# Patient Record
Sex: MALE | Race: WHITE | NOT HISPANIC OR LATINO
[De-identification: names, ages, dates, MRNs, and addresses within clinical notes are randomized per-mention and may not be internally consistent; named-entity substitution may affect disease eponyms.]

---

## 2024-03-19 PROBLEM — Z00.00 ENCOUNTER FOR PREVENTIVE HEALTH EXAMINATION: Status: ACTIVE | Noted: 2024-03-19

## 2024-03-20 ENCOUNTER — NON-APPOINTMENT (OUTPATIENT)
Age: 29
End: 2024-03-20

## 2024-03-22 ENCOUNTER — NON-APPOINTMENT (OUTPATIENT)
Age: 29
End: 2024-03-22

## 2024-03-22 ENCOUNTER — APPOINTMENT (OUTPATIENT)
Dept: COLORECTAL SURGERY | Facility: CLINIC | Age: 29
End: 2024-03-22
Payer: COMMERCIAL

## 2024-03-22 VITALS
SYSTOLIC BLOOD PRESSURE: 136 MMHG | DIASTOLIC BLOOD PRESSURE: 77 MMHG | TEMPERATURE: 98.2 F | WEIGHT: 174 LBS | HEIGHT: 77 IN | HEART RATE: 78 BPM | BODY MASS INDEX: 20.55 KG/M2

## 2024-03-22 DIAGNOSIS — Z78.9 OTHER SPECIFIED HEALTH STATUS: ICD-10-CM

## 2024-03-22 DIAGNOSIS — K62.5 HEMORRHAGE OF ANUS AND RECTUM: ICD-10-CM

## 2024-03-22 DIAGNOSIS — K64.8 OTHER HEMORRHOIDS: ICD-10-CM

## 2024-03-22 DIAGNOSIS — L29.0 PRURITUS ANI: ICD-10-CM

## 2024-03-22 PROCEDURE — 46600 DIAGNOSTIC ANOSCOPY SPX: CPT

## 2024-03-22 PROCEDURE — 99202 OFFICE O/P NEW SF 15 MIN: CPT | Mod: 25

## 2024-03-22 NOTE — HISTORY OF PRESENT ILLNESS
[FreeTextEntry1] : 29 yo M presents for initial evaluation of rectal bleeding, referred by Dr. Severiano Sloan  [facility] Migraines PSH tonsillectomy and wisdom teeth removal Denies Medication use.  All: Topamax (stopped sweating)  Denies [facility] colorectal CA or IBD Never colonoscopy  About 1.5 weeks ago, pt had BM and noted BRB on TP that heavier than he's ever seen before and noted blood in toilet bowl as well. He had a couple more BMs, then noticed he had indigestion, abd bloating w/ slight discomfort and some constipation, then had some anal itching as well. Admits he typically orders in for meals at work and often it's heavier meals/carbs and not much produce. He started himself on Metamucil and has been increasing dietary fiber and reports overall his symptoms are improving.  Seen by GI who did CLAUDE, noted skin tag per pt and referred to this office for further evaluation.  Pt denies anal pain or tissue swelling. Admits to slight stinging sensation on anus and started Prep H wipes. Not using topical creams  BH: typically goes 3-4 times daily, however stools are firmer and pebble like but improving w/ Metamucil and had improved consistency this morning He has been increasing salads and raisin bran this week and continues 3-5 glasses water daily Not taking stool softeners  Denies ASA. Took Advil for back pain for headache.

## 2024-03-22 NOTE — PHYSICAL EXAM
[Skin Tags] : there were no residual hemorrhoidal skin tags seen [Normal] : was normal [de-identified] : Mild perianal excoriation/fissuring of the anal margin skin. [None] : there was no rectal mass  [FreeTextEntry1] : Medical assistant was present for the entire exam.  Anoscopy was performed for evaluation of the patients rectal bleeding  history . The risks, benefits and alternatives were reviewed.  A lighted anoscope was passed into the anal canal and the entire anal mucosal surface was inspected..   The findings revealed moderate internal hemorrhoids. No masses or lesions were identified.

## 2024-03-22 NOTE — ASSESSMENT
[FreeTextEntry1] : Advised the patient of the etiology and treatment of pruritus ani.  Recommendations including appropriate perianal hygiene changes, avoidance of soaps and astringents, lubricating lotions and avoidance of excessive wiping detailed.  Advised increasing fiber regimen.  Recommend return if symptoms are persistent.